# Patient Record
Sex: MALE | Race: WHITE
[De-identification: names, ages, dates, MRNs, and addresses within clinical notes are randomized per-mention and may not be internally consistent; named-entity substitution may affect disease eponyms.]

---

## 2022-10-17 ENCOUNTER — APPOINTMENT (OUTPATIENT)
Dept: ORTHOPEDIC SURGERY | Facility: CLINIC | Age: 67
End: 2022-10-17

## 2022-10-17 VITALS — BODY MASS INDEX: 28.72 KG/M2 | HEIGHT: 67 IN | WEIGHT: 183 LBS

## 2022-10-17 PROCEDURE — 73030 X-RAY EXAM OF SHOULDER: CPT | Mod: RT

## 2022-10-17 PROCEDURE — 99204 OFFICE O/P NEW MOD 45 MIN: CPT

## 2022-10-17 PROCEDURE — 73010 X-RAY EXAM OF SHOULDER BLADE: CPT | Mod: RT

## 2022-10-17 RX ORDER — METHYLPREDNISOLONE 4 MG/1
4 TABLET ORAL
Qty: 1 | Refills: 0 | Status: ACTIVE | COMMUNITY
Start: 2022-10-17 | End: 1900-01-01

## 2022-10-17 NOTE — ASSESSMENT
[FreeTextEntry1] : We reviewed the findings and his options.\par His questions were answered.\par An MRI of the right shoulder is indicated. \par MDP is prescribed.\par Aleve use discussed. \par PT is prescribed. \par If needed, he will call for Tylenol with codeine. \par He will follow up after the MRI, an injection may be considered. \par \par Patient seen by Mazin Gomez M.D.\par Entered by Radha Alcala acting as scribe.

## 2022-10-17 NOTE — REASON FOR VISIT
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 No prior history/treatment. Reaching is painful. Night symptoms can occur. There is no n/t. NSAID use as needed with temporary relief. He has a known history of left shoulder calcific tendonitis which was treated with surgery in ~2014.

## 2022-10-17 NOTE — PHYSICAL EXAM
[Right] : right shoulder [Left] : left shoulder [Moderate] : moderate [Mild] : mild [5 ___] : forward flexion 5[unfilled]/5 [5___] : external rotation 5[unfilled]/5 [] : no ecchymosis [Sitting] : sitting [FreeTextEntry8] : There is minimal GT tenderness. [de-identified] : [place]'s is equivocal.  [FreeTextEntry9] : IR to T12. [TWNoteComboBox6] : internal rotation L4 [TWNoteComboBox4] : passive forward flexion 160 degrees [de-identified] : external rotation 50 degrees

## 2022-10-17 NOTE — IMAGING
[Right] : right shoulder [FreeTextEntry1] : The glenoid has a small spur. The GH joint is ok. There is a decent sized posterior calcium. The AC has cysts and spurs. [FreeTextEntry5] : There is a type II acromion.

## 2022-10-17 NOTE — HISTORY OF PRESENT ILLNESS
[6] : 6 [4] : 4 [Stabbing] : stabbing [Constant] : constant [Meds] : meds [] : no [FreeTextEntry1] : right shoulder [FreeTextEntry5] : pt has been having right shoulder pain for years, recently the pain has gotten worse. [FreeTextEntry7] : down to his fingers  [FreeTextEntry9] : aleve and tylenol  [de-identified] : movement

## 2022-10-17 NOTE — CONSULT LETTER
[Dear  ___] : Dear  [unfilled], [Consult Letter:] : I had the pleasure of evaluating your patient, [unfilled]. [Please see my note below.] : Please see my note below. [Consult Closing:] : Thank you very much for allowing me to participate in the care of this patient.  If you have any questions, please do not hesitate to contact me. [Sincerely,] : Sincerely, [FreeTextEntry3] : Mazin Motley M.D.\par Shoulder Surgery

## 2022-10-18 ENCOUNTER — FORM ENCOUNTER (OUTPATIENT)
Age: 67
End: 2022-10-18

## 2022-10-19 ENCOUNTER — APPOINTMENT (OUTPATIENT)
Dept: ORTHOPEDIC SURGERY | Facility: CLINIC | Age: 67
End: 2022-10-19

## 2022-10-19 ENCOUNTER — APPOINTMENT (OUTPATIENT)
Dept: MRI IMAGING | Facility: CLINIC | Age: 67
End: 2022-10-19

## 2022-10-19 PROCEDURE — 73221 MRI JOINT UPR EXTREM W/O DYE: CPT | Mod: RT,MH

## 2022-10-26 ENCOUNTER — APPOINTMENT (OUTPATIENT)
Dept: ORTHOPEDIC SURGERY | Facility: CLINIC | Age: 67
End: 2022-10-26

## 2022-10-26 VITALS — BODY MASS INDEX: 28.72 KG/M2 | WEIGHT: 183 LBS | HEIGHT: 67 IN

## 2022-10-26 PROCEDURE — 20611 DRAIN/INJ JOINT/BURSA W/US: CPT | Mod: RT

## 2022-10-26 PROCEDURE — 99214 OFFICE O/P EST MOD 30 MIN: CPT | Mod: 25

## 2022-10-26 NOTE — DATA REVIEWED
[FreeTextEntry1] : RIGHT SHOULDER MRI 10/19/22:  There is some AC fluid.  There is slight medial subluxation of the biceps with partial subscapularis tearing.  The calcium is noted with partial tearing.  There is a large cyst in the spinoglenoid notch.

## 2022-10-26 NOTE — HISTORY OF PRESENT ILLNESS
[7] : 7 [0] : 0 [Stabbing] : stabbing [Intermittent] : intermittent [Leisure] : leisure [Sleep] : sleep [Meds] : meds [Full time] : Work status: full time [de-identified] : Patient is here for MRI results. [] : no [FreeTextEntry1] : right shoulder [FreeTextEntry9] : Aleve [de-identified] : Stretching

## 2022-10-26 NOTE — ASSESSMENT
[FreeTextEntry1] : We reviewed the MRI results. \par An EMG is indicated. \par PT is prescribed. \par An injection indicated today. \par Questions answered. \par \par Procedure Name: Large Joint Injection / Aspiration: Depomedrol, Lidocaine and Guidance Ultrasound\par \par Large Joint Injection was performed because of pain and inflammation.\par Depomedrol: An injection of Depomedrol 40 mg , 2 cc.\par Lidocaine: An injection of Lidocaine 1 mg , 13 cc.\par \par Medication was injected in the right subacromial space/glenohumeral joint. Patient has tried OTC's including aspirin, Ibuprofen, Aleve etc or prescription NSAIDS, and/or exercises at home and/ or physical therapy without satisfactory response. After verbal consent using sterile preparation and technique. The risks, benefits, and alternatives to cortisone injection were explained in full to the patient. Risks outlined include but are not limited to infection, sepsis, bleeding, scarring, skin discoloration, temporary increase in pain, syncopal episode, failure to resolve symptoms, allergic reaction, symptom recurrence, and elevation of blood sugar in diabetics. Patient understood the risks. All questions were answered. After discussion of options, patient requested an injection. Oral informed consent was obtained and sterile prep was done of the injection site. Sterile technique was utilized for the procedure including the preparation of the solutions used for the injection. Patient tolerated the procedure well. Advised to ice the injection site this evening. Prep with betadine locally to site. Sterile technique used. Patient tolerated procedure well. Post Procedure Instructions: Patient was advised to call if redness, pain, or fever occur and apply ice for 15 min. out of every hour for the next 12-24 hours as tolerated. Patient was advised to rest the joint(s) for 3 days. Ultrasound Guidance was used for the following reasons: for precise injection in area of tear. Visualization of the needle and placement of injection was performed without complication.

## 2022-10-26 NOTE — PHYSICAL EXAM
[Right] : right shoulder [Moderate] : moderate [Mild] : mild [5 ___] : forward flexion 5[unfilled]/5 [Left] : left shoulder [Sitting] : sitting [4___] : external rotation 4[unfilled]/5 [] : no ecchymosis [de-identified] : [place]'s is equivocal.  [FreeTextEntry8] : There is minimal GT tenderness. [FreeTextEntry9] : IR to T12. [TWNoteComboBox6] : internal rotation L4 [TWNoteComboBox4] : passive forward flexion 160 degrees [de-identified] : external rotation 50 degrees

## 2022-10-26 NOTE — REASON FOR VISIT
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 No prior history/treatment. Reaching is painful. Night symptoms can occur. There is no n/t. NSAID use as needed with temporary relief. He has a known history of left shoulder calcific tendonitis which was treated with surgery in ~2014.  The MRI was completed.  Overall, he is doing OK.

## 2022-10-31 ENCOUNTER — APPOINTMENT (OUTPATIENT)
Dept: NEUROLOGY | Facility: CLINIC | Age: 67
End: 2022-10-31

## 2022-10-31 ENCOUNTER — TRANSCRIPTION ENCOUNTER (OUTPATIENT)
Age: 67
End: 2022-10-31

## 2022-10-31 DIAGNOSIS — G56.01 CARPAL TUNNEL SYNDROME, RIGHT UPPER LIMB: ICD-10-CM

## 2022-10-31 DIAGNOSIS — M79.621 PAIN IN RIGHT UPPER ARM: ICD-10-CM

## 2022-10-31 PROCEDURE — 95886 MUSC TEST DONE W/N TEST COMP: CPT

## 2022-10-31 PROCEDURE — 95910 NRV CNDJ TEST 7-8 STUDIES: CPT

## 2022-11-16 ENCOUNTER — APPOINTMENT (OUTPATIENT)
Dept: ORTHOPEDIC SURGERY | Facility: CLINIC | Age: 67
End: 2022-11-16

## 2022-11-23 ENCOUNTER — APPOINTMENT (OUTPATIENT)
Dept: ORTHOPEDIC SURGERY | Facility: CLINIC | Age: 67
End: 2022-11-23

## 2022-11-23 PROCEDURE — 99214 OFFICE O/P EST MOD 30 MIN: CPT

## 2022-11-23 NOTE — HISTORY OF PRESENT ILLNESS
[7] : 7 [0] : 0 [Stabbing] : stabbing [Intermittent] : intermittent [Leisure] : leisure [Sleep] : sleep [Meds] : meds [Full time] : Work status: full time [de-identified] : Patient is here for MRI results. [] : no [FreeTextEntry1] : right shoulder [FreeTextEntry9] : Aleve [de-identified] : Stretching

## 2022-11-23 NOTE — PHYSICAL EXAM
[Right] : right shoulder [Moderate] : moderate [Mild] : mild [5 ___] : forward flexion 5[unfilled]/5 [Left] : left shoulder [Sitting] : sitting [5___] : external rotation 5[unfilled]/5 [] : no ecchymosis [FreeTextEntry8] : There is minimal GT tenderness. [de-identified] : [place]'s is equivocal.  [FreeTextEntry9] : IR to T12. [TWNoteComboBox6] : internal rotation L4 [TWNoteComboBox4] : passive forward flexion 160 degrees [de-identified] : external rotation 50 degrees

## 2022-11-23 NOTE — PHYSICAL EXAM
[Right] : right shoulder [Moderate] : moderate [Mild] : mild [5 ___] : forward flexion 5[unfilled]/5 [Left] : left shoulder [Sitting] : sitting [5___] : external rotation 5[unfilled]/5 [] : no ecchymosis [FreeTextEntry8] : There is minimal GT tenderness. [de-identified] : [place]'s is equivocal.  [FreeTextEntry9] : IR to T12. [TWNoteComboBox6] : internal rotation L4 [TWNoteComboBox4] : passive forward flexion 160 degrees [de-identified] : external rotation 50 degrees

## 2022-11-23 NOTE — ASSESSMENT
[FreeTextEntry1] : Given his relative strength and improvement, we will pursue a nonoperative course. \par He has PT already scheduled.\par The indications for surgery were reviewed. \par He agrees with the plan. \par Questions answered. \par \par Patient seen by Mazin Gomez M.D.\par Entered by Radha Alcala acting as scribe.

## 2022-11-23 NOTE — HISTORY OF PRESENT ILLNESS
[7] : 7 [0] : 0 [Stabbing] : stabbing [Intermittent] : intermittent [Leisure] : leisure [Sleep] : sleep [Meds] : meds [Full time] : Work status: full time [de-identified] : Patient is here for MRI results. [] : no [FreeTextEntry1] : right shoulder [FreeTextEntry9] : Aleve [de-identified] : Stretching

## 2022-11-23 NOTE — DATA REVIEWED
[FreeTextEntry1] : RIGHT SHOULDER MRI 10/19/22:  There is some AC fluid.  There is slight medial subluxation of the biceps with partial subscapularis tearing.  The calcium is noted with partial tearing.  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

## 2022-12-07 ENCOUNTER — APPOINTMENT (OUTPATIENT)
Dept: ORTHOPEDIC SURGERY | Facility: CLINIC | Age: 67
End: 2022-12-07

## 2022-12-14 ENCOUNTER — APPOINTMENT (OUTPATIENT)
Dept: ORTHOPEDIC SURGERY | Facility: CLINIC | Age: 67
End: 2022-12-14

## 2022-12-14 VITALS — BODY MASS INDEX: 29.03 KG/M2 | WEIGHT: 185 LBS | HEIGHT: 67 IN

## 2022-12-14 PROCEDURE — 99214 OFFICE O/P EST MOD 30 MIN: CPT

## 2022-12-14 NOTE — ASSESSMENT
[FreeTextEntry1] : We discussed his course. \par PT is prescribed. \par He will call for follow up. \par Questions answered. \par \par Patient seen by Mazin Gomez M.D. \par Entered by Radha Alcala acting as scribe.

## 2022-12-14 NOTE — HISTORY OF PRESENT ILLNESS
[Full time] : Work status: full time [5] : 5 [3] : 3 [Dull/Aching] : dull/aching [Intermittent] : intermittent [Sleep] : sleep [Rest] : rest [de-identified] : Patient is here for a follow up on his rightt shoulder. Pain has improved.  [] : no [FreeTextEntry1] : right shoulder [FreeTextEntry9] : Advil [de-identified] : overhead activity [de-identified] : Physical therapy 2 x a week, HEP

## 2022-12-14 NOTE — REASON FOR VISIT
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 No prior history/treatment. Reaching is painful. Night symptoms can occur. There is no n/t. NSAID use as needed with temporary relief. He has a known history of left shoulder calcific tendonitis which was treated with surgery in ~2014.  The MRI was completed.  Overall, he is doing better.  The injection with PT have helped. He feels 66% better.

## 2022-12-14 NOTE — PHYSICAL EXAM
[Right] : right shoulder [Moderate] : moderate [Mild] : mild [5 ___] : forward flexion 5[unfilled]/5 [5___] : external rotation 5[unfilled]/5 [Left] : left shoulder [Sitting] : sitting [] : no ecchymosis [FreeTextEntry8] : There is minimal GT tenderness. [de-identified] : There is minimal discomfort.  [de-identified] : [place]'s is equivocal.  [FreeTextEntry9] : IR to T12. [TWNoteComboBox6] : internal rotation L4 [TWNoteComboBox4] : passive forward flexion 160 degrees [de-identified] : external rotation 50 degrees

## 2023-03-05 ENCOUNTER — FORM ENCOUNTER (OUTPATIENT)
Age: 68
End: 2023-03-05

## 2023-03-06 ENCOUNTER — APPOINTMENT (OUTPATIENT)
Dept: MRI IMAGING | Facility: CLINIC | Age: 68
End: 2023-03-06
Payer: MEDICARE

## 2023-03-06 ENCOUNTER — APPOINTMENT (OUTPATIENT)
Dept: ORTHOPEDIC SURGERY | Facility: CLINIC | Age: 68
End: 2023-03-06
Payer: MEDICARE

## 2023-03-06 ENCOUNTER — FORM ENCOUNTER (OUTPATIENT)
Age: 68
End: 2023-03-06

## 2023-03-06 VITALS — WEIGHT: 185 LBS | HEIGHT: 67.5 IN | BODY MASS INDEX: 28.7 KG/M2

## 2023-03-06 PROCEDURE — 99214 OFFICE O/P EST MOD 30 MIN: CPT

## 2023-03-06 PROCEDURE — 73221 MRI JOINT UPR EXTREM W/O DYE: CPT | Mod: RT,MH

## 2023-03-06 RX ORDER — METHYLPREDNISOLONE 4 MG/1
4 TABLET ORAL
Qty: 1 | Refills: 0 | Status: ACTIVE | COMMUNITY
Start: 2023-03-06 | End: 1900-01-01

## 2023-03-06 NOTE — ASSESSMENT
[FreeTextEntry1] : We reviewed his history and his options.\par Will order an MRI of the neck to assess nerve compression. \par Will order an MRI of the right shoulder to asses the status of the ganglion cyst. \par MDP is prescribed. \par His questions were answered.\par \par Patient seen by Dr. Mazin Gomez.\par Progress note completed by Teresa BOTELLO.\par Patient seen by Teresa BOTELLO under the supervision of Dr. Mazin Gomez.\par Entered by Teresa BOTELLO acting as a scribe for Dr. Mazin Gomez.\par Entered by Radha Alcala acting as scribe.

## 2023-03-06 NOTE — PHYSICAL EXAM
[Right] : right shoulder [Moderate] : moderate [Mild] : mild [5 ___] : forward flexion 5[unfilled]/5 [5___] : external rotation 5[unfilled]/5 [Left] : left shoulder [Sitting] : sitting [] : no ecchymosis [FreeTextEntry8] : There is minimal GT tenderness. [de-identified] : There is minimal discomfort.  [de-identified] : [place]'s is equivocal.  [FreeTextEntry9] : IR to T12. [TWNoteComboBox6] : internal rotation L4 [TWNoteComboBox4] : passive forward flexion 160 degrees [de-identified] : external rotation 50 degrees

## 2023-03-06 NOTE — HISTORY OF PRESENT ILLNESS
[3] : 3 [0] : 0 [de-identified] : pt is here today for a follow up for his right shoulder. pt states his pain came back recently, started to have some tingling down his arm as well  [FreeTextEntry1] : right shoulder  [de-identified] : resting

## 2023-03-06 NOTE — REASON FOR VISIT
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 He had been doing well until early February 2023 when he started having tingling down the arm.  He has a known history of left shoulder calcific tendonitis which was treated with surgery in ~2014.  The MRI was completed in October 2022.  The SA/GH injection in October with PT had helped, though his pain has returned recently without new injury.  There is soreness down the arm with some tingling.

## 2023-03-07 ENCOUNTER — APPOINTMENT (OUTPATIENT)
Dept: MRI IMAGING | Facility: CLINIC | Age: 68
End: 2023-03-07
Payer: MEDICARE

## 2023-03-07 PROCEDURE — 72141 MRI NECK SPINE W/O DYE: CPT | Mod: MH

## 2023-03-15 ENCOUNTER — APPOINTMENT (OUTPATIENT)
Dept: ORTHOPEDIC SURGERY | Facility: CLINIC | Age: 68
End: 2023-03-15
Payer: MEDICARE

## 2023-03-15 VITALS — WEIGHT: 185 LBS | HEIGHT: 67.5 IN | BODY MASS INDEX: 28.7 KG/M2

## 2023-03-15 DIAGNOSIS — M75.41 IMPINGEMENT SYNDROME OF RIGHT SHOULDER: ICD-10-CM

## 2023-03-15 DIAGNOSIS — G56.81 OTHER SPECIFIED MONONEUROPATHIES OF RIGHT UPPER LIMB: ICD-10-CM

## 2023-03-15 DIAGNOSIS — M75.31 CALCIFIC TENDINITIS OF RIGHT SHOULDER: ICD-10-CM

## 2023-03-15 DIAGNOSIS — M25.511 PAIN IN RIGHT SHOULDER: ICD-10-CM

## 2023-03-15 DIAGNOSIS — M67.921 UNSPECIFIED DISORDER OF SYNOVIUM AND TENDON, RIGHT UPPER ARM: ICD-10-CM

## 2023-03-15 PROCEDURE — 99214 OFFICE O/P EST MOD 30 MIN: CPT

## 2023-03-15 NOTE — REASON FOR VISIT
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 He had been doing well until early February 2023 when he started having tingling down the arm.  He has a known history of left shoulder calcific tendonitis which was treated with surgery in ~2014.  The MRI was completed in October 2022.  The SA/GH injection in October with PT had helped, though his pain has returned recently without new injury.  There is soreness down the arm with some tingling.  Overall, he is feeling better since the MDP.  The MRIs of the shoulder and neck were completed.

## 2023-03-15 NOTE — HISTORY OF PRESENT ILLNESS
[1] : 2 [Dull/Aching] : dull/aching [Tingling] : tingling [] : yes [Intermittent] : intermittent [Nothing helps with pain getting better] : Nothing helps with pain getting better [Full time] : Work status: full time [de-identified] : Patient is here for MRI results. Pain has improved.  [FreeTextEntry1] : Right shoulder and neck [FreeTextEntry7] : a little below the elbow [de-identified] : none

## 2023-03-15 NOTE — PHYSICAL EXAM
[Right] : right shoulder [Moderate] : moderate [Mild] : mild [5 ___] : forward flexion 5[unfilled]/5 [5___] : external rotation 5[unfilled]/5 [Left] : left shoulder [Sitting] : sitting [] : no ecchymosis [FreeTextEntry8] : There is minimal GT tenderness. [de-identified] : There is minimal discomfort.  [FreeTextEntry9] : IR to T12. [de-identified] : [place]'s is equivocal.  [TWNoteComboBox6] : internal rotation L4 [TWNoteComboBox4] : passive forward flexion 160 degrees [de-identified] : external rotation 50 degrees

## 2023-03-15 NOTE — ASSESSMENT
[FreeTextEntry1] : We discussed the MRI findings. \par He will follow up with his PCP regarding thyroid nodule. \par Advised to follow up with Neuro. \par He will follow up with Dr. Rogers for cervical issues. \par Questions answered. \par \par Patient seen by Mazin Gomez M.D.\par Entered by Radha Alcala acting as scribe.

## 2023-03-15 NOTE — DATA REVIEWED
[FreeTextEntry1] : RIGHT SHOULDER MRI 10/19/22:  There is some AC fluid.  There is slight medial subluxation of the biceps with partial subscapularis tearing.  The calcium is noted with partial tearing.  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 The cyst remains in position. The report suggests improved calcium, no change in cyst. The biceps is not involved. The muscle is good. \par \par MRI CERVICAL SPINE 3/7/23: There are multilevel degenerative changes. Stenosis and root impoingement noted.

## 2023-03-22 ENCOUNTER — APPOINTMENT (OUTPATIENT)
Dept: ORTHOPEDIC SURGERY | Facility: CLINIC | Age: 68
End: 2023-03-22
Payer: MEDICARE

## 2023-03-22 DIAGNOSIS — M47.812 SPONDYLOSIS W/OUT MYELOPATHY OR RADICULOPATHY, CERVICAL REGION: ICD-10-CM

## 2023-03-22 PROCEDURE — 99214 OFFICE O/P EST MOD 30 MIN: CPT

## 2023-03-22 RX ORDER — GABAPENTIN 300 MG/1
300 CAPSULE ORAL
Qty: 60 | Refills: 1 | Status: ACTIVE | COMMUNITY
Start: 2023-03-22 | End: 1900-01-01

## 2023-03-22 NOTE — HISTORY OF PRESENT ILLNESS
[Neck] : neck [Gradual] : gradual [Sudden] : sudden [6] : 6 [5] : 5 [Burning] : burning [Shooting] : shooting [Stabbing] : stabbing [Intermittent] : intermittent [Rest] : rest [Exercising] : exercising [Full time] : Work status: full time [de-identified] : 03/22/2023: 67 year old RHD male [facility] Pain for the last 3 months. No injury. c/o numbness/ tingling pain down his RUE. No weakness. Denies neck pain/ stiffness. No nighttime sx. Denies radicular sx/ N/ T/ weakness on LEft side. Left arm is okay.  \par \par No h/o Spine Surgery\par \par Has tried MDP with no improvement\par No prior PT/ injections/ acupuncture/ chiropractor\par Had an injection in the shoulder \par \par PMHx: No DM/ no blood thinners\par \par Occupation: business owner - desk work\par \par Xrays:\par C spine - not done \par \par MRI Cspine:\par 1. Straightening of the cervical lordosis, slight retrolisthesis at C4-C5 and C5-C6, and slight anterolisthesis at \par C6-C7 and C7-T1.\par 2. Multilevel degenerative disc disease and multiple disc herniations with cord impingement and right greater \par than left exiting nerve root impingement at C4-C5 and C5-C6, right exiting nerve root impingement at C6-C7, \par left exiting nerve root impingement at C3-C4, and multilevel foraminal narrowing.\par 3. No acute fracture or cord compression.\par 4. Findings suggesting a cystic nodule in the left thyroid gland measuring approximately 1.4 cm incompletely \par evaluated on the current exam. Recommend thyroid ultrasound correlation to further evaluate. 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

## 2023-03-22 NOTE — DISCUSSION/SUMMARY
[de-identified] : reviewed the case and the imaging\par discussoin of tx optoins \par Has NF narrowing in the C spine with radiculopathy \par referral to pain management \par gabapentin discussed \par would be a hesitent to operate on the neck as he doesn’t have neck pain\par \par hes had the thryoid nodule checked on

## 2023-03-30 ENCOUNTER — APPOINTMENT (OUTPATIENT)
Dept: PAIN MANAGEMENT | Facility: CLINIC | Age: 68
End: 2023-03-30
Payer: MEDICARE

## 2023-03-30 VITALS — HEIGHT: 67 IN | WEIGHT: 185 LBS | BODY MASS INDEX: 29.03 KG/M2

## 2023-03-30 PROCEDURE — 99204 OFFICE O/P NEW MOD 45 MIN: CPT

## 2023-03-30 NOTE — DISCUSSION/SUMMARY
[de-identified] : After discussing various treatment options with the patient including but not limited to oral medications, physical therapy, exercise modalities as well as interventional spinal injections, we have decided with the following plan:\par \par - Continue Home exercises, stretching, activity modification, physical therapy, and conservative care.\par - MRI report and/or images was reviewed and discussed with the patient.\par - Recommend C7-T1 Cervical Epidural Steroid Injection under fluoroscopic guidance with image. (right) \par - The risks, benefits and alternatives of the proposed procedure were explained in detail with the patient. The risks outlined include but are not limited to infection, bleeding, post-dural puncture headache, nerve injury, a temporary increase in pain, failure to resolve symptoms, allergic reaction, symptom recurrence, and possible elevation of blood sugar in diabetics. All questions were answered to patient's apparent satisfaction and he/she verbalized an understanding.\par - Patient is presenting with acute/sub-acute radicular pain with impairment in ADLs and functionality.  The pain has not responded to conservative care including NSAID therapy and/or physical therapy.  There is no bleeding tendency, unstable medical condition, or systemic infection.\par - Follow up in 1-2 weeks post injection for re-evaluation.\par *pt takes 81mg Aspirin\par

## 2023-03-30 NOTE — PHYSICAL EXAM
[de-identified] : Constitutional; Appears well, no apparent distress\par Ability to communicate: Normal \par Respiratory: non-labored breathing\par Skin: No rash noted\par Head: Normocephalic, atraumatic\par Neck: no visible thyroid enlargement\par Eyes: Extraocular movements intact\par Neurologic: Alert and oriented x3\par Psychiatric: normal mood, affect and behavior  [] : positive [place]

## 2023-05-04 ENCOUNTER — APPOINTMENT (OUTPATIENT)
Dept: PAIN MANAGEMENT | Facility: CLINIC | Age: 68
End: 2023-05-04
Payer: MEDICARE

## 2023-05-04 DIAGNOSIS — M54.12 RADICULOPATHY, CERVICAL REGION: ICD-10-CM

## 2023-05-04 PROCEDURE — 62321 NJX INTERLAMINAR CRV/THRC: CPT

## 2023-05-04 NOTE — PROCEDURE
[FreeTextEntry3] : Date of Service: 05/04/2023 \par \par Account: [id number]\par \par Patient: OWEN PALOMARES \par \par YOB: 1955\par \par Age: 67 year\par \par \par Surgeon:                                                         Telly Bell D.O.\par \par Pre-Operative Diagnosis:                            Cervical Radiculopathy (M54.12)\par \par Post-Operative Diagnosis:                          Cervical Radiculopathy (M54.12)\par \par Procedure:                                                     Interlaminar cervical epidural steroid injection (C7-T1) under fluoroscopic guidance\par \par Anesthesia:                                                    Local with MAC\par \par \par This procedure was carried out using fluoroscopic guidance.  The risks and benefits of the procedure were discussed extensively with the patient.  The consent of the patient was obtained and the following procedure was performed.\par \par The patient was placed in the prone position using thoracic and chin supports.  The cervical area was prepped and draped in a sterile fashion. A timeout was performed with all essential staff present and the site and side were verified. The fluoroscope visualized the C7-T1 interspace using slight cephalad-caudad angulation and this area was marked.  Using sterile technique, the superficial skin was anesthetized with 1% Lidocaine without epinephrine.  An 18-gauge Tuohy needle was advanced under fluoroscopy using [id number] technique until ligament was engaged.  The stylette was then removed and a column of preservative free normal saline flushed through the Tuohy needle and left with a concave fluid level above the butterfly portion of the Tuohy needle.  The needle was then advanced under fluoroscopic guidance until the column of saline disappeared.  Lateral view confirmed final needle tip placement in the epidural space.  After negative aspiration for heme and CSF, 1 cc of Omnipaque contrast injected under live fluoroscopy, confirmed good cervical epidurogram.  \par \par Cervical epidurogram showed no evidence of intrathecal or intravascular flow, and good bilateral epidural flow from C3 to T2 levels.  An injectate of 3 cc of preservative free normal saline plus 12 mg of betamethasone was then injected into the epidural space. The needle was subsequently removed and pressure was applied.\par \par Anesthesia personnel were present throughout the procedure.  The patient tolerated the procedure well and was instructed to contact me immediately if there were any problems.\par \par Telly Bell D.O.\par

## 2023-05-18 ENCOUNTER — APPOINTMENT (OUTPATIENT)
Dept: PAIN MANAGEMENT | Facility: CLINIC | Age: 68
End: 2023-05-18

## 2024-09-06 ENCOUNTER — APPOINTMENT (OUTPATIENT)
Dept: ORTHOPEDIC SURGERY | Facility: CLINIC | Age: 69
End: 2024-09-06
Payer: MEDICARE

## 2024-09-06 ENCOUNTER — APPOINTMENT (OUTPATIENT)
Dept: MRI IMAGING | Facility: CLINIC | Age: 69
End: 2024-09-06

## 2024-09-06 VITALS — BODY MASS INDEX: 28.56 KG/M2 | HEIGHT: 67 IN | WEIGHT: 182 LBS

## 2024-09-06 DIAGNOSIS — Z86.79 PERSONAL HISTORY OF OTHER DISEASES OF THE CIRCULATORY SYSTEM: ICD-10-CM

## 2024-09-06 DIAGNOSIS — M54.16 RADICULOPATHY, LUMBAR REGION: ICD-10-CM

## 2024-09-06 PROCEDURE — G2211 COMPLEX E/M VISIT ADD ON: CPT

## 2024-09-06 PROCEDURE — 73503 X-RAY EXAM HIP UNI 4/> VIEWS: CPT | Mod: RT

## 2024-09-06 PROCEDURE — 99214 OFFICE O/P EST MOD 30 MIN: CPT

## 2024-09-06 PROCEDURE — 72148 MRI LUMBAR SPINE W/O DYE: CPT | Mod: MH

## 2024-09-06 RX ORDER — METHYLPREDNISOLONE 4 MG/1
4 TABLET ORAL
Qty: 1 | Refills: 0 | Status: ACTIVE | COMMUNITY
Start: 2024-09-06 | End: 1900-01-01

## 2024-09-06 RX ORDER — MELOXICAM 15 MG/1
15 TABLET ORAL
Qty: 30 | Refills: 1 | Status: ACTIVE | COMMUNITY
Start: 2024-09-06 | End: 1900-01-01

## 2024-09-06 RX ORDER — AMLODIPINE BESYLATE 5 MG/1
TABLET ORAL
Refills: 0 | Status: ACTIVE | COMMUNITY

## 2024-09-06 RX ORDER — EZETIMIBE 10 MG/1
TABLET ORAL
Refills: 0 | Status: ACTIVE | COMMUNITY

## 2024-09-06 RX ORDER — RAMIPRIL 10 MG
TABLET ORAL
Refills: 0 | Status: ACTIVE | COMMUNITY

## 2024-09-06 NOTE — ASSESSMENT
[FreeTextEntry1] : WE DISCUSSED FINDINGS. WILL START PT LUMBAR SPINE AND GIVE MDP WE DISCUSSED NSAIDS AND USE. WILL TAKE OTC MEDS AFTER MDP PRN MOBIC 15mg SENT FOR AFTER  WILL GET MRI AND FU WITH PAIN MANAGEMENT

## 2024-09-06 NOTE — IMAGING
[Right] : right hip with pelvis [There are no fractures, subluxations or dislocations. No significant abnormalities are seen] : There are no fractures, subluxations or dislocations. No significant abnormalities are seen [FreeTextEntry9] : LUMBAR DDD NOTED

## 2024-09-06 NOTE — HISTORY OF PRESENT ILLNESS
[Sharp] : sharp [Shooting] : shooting [Stabbing] : stabbing [Intermittent] : intermittent [Ice] : ice [Walking] : walking [Exercising] : exercising [de-identified] : 9.6.24 PATIENT HERE FOR RIGHT HIP PAIN. PAIN STARTED TWO AND A HALF WEEKS AGO, NO INJURY. STATES PAIN STARTED WHEN PLAYING GOLF, SEEN BY CHIRO WITH NO IMPROVMENT. PAIN TO LATERAL HIP WITH RADIATION TO RIGHT LEG. NO PARETHESIAS, NO BB DYSFUCNTION

## 2024-09-06 NOTE — PHYSICAL EXAM
[Extension] : extension [Right] : right hip [] : no numbness/tingling of foot [FreeTextEntry8] : GOOD ROM WITHOUT PAIN

## 2024-09-19 ENCOUNTER — APPOINTMENT (OUTPATIENT)
Dept: PAIN MANAGEMENT | Facility: CLINIC | Age: 69
End: 2024-09-19
Payer: MEDICARE

## 2024-09-19 VITALS — BODY MASS INDEX: 28.56 KG/M2 | WEIGHT: 182 LBS | HEIGHT: 67 IN

## 2024-09-19 DIAGNOSIS — M54.50 LOW BACK PAIN, UNSPECIFIED: ICD-10-CM

## 2024-09-19 DIAGNOSIS — M79.10 MYALGIA, UNSPECIFIED SITE: ICD-10-CM

## 2024-09-19 DIAGNOSIS — M54.16 RADICULOPATHY, LUMBAR REGION: ICD-10-CM

## 2024-09-19 PROCEDURE — 99214 OFFICE O/P EST MOD 30 MIN: CPT

## 2024-09-19 RX ORDER — OXYCODONE AND ACETAMINOPHEN 5; 325 MG/1; MG/1
5-325 TABLET ORAL
Qty: 28 | Refills: 0 | Status: ACTIVE | COMMUNITY
Start: 2024-09-19 | End: 1900-01-01

## 2024-09-19 NOTE — DISCUSSION/SUMMARY
[de-identified] : After discussing various treatment options with the patient including but not limited to oral medications, physical therapy, exercise modalities as well as interventional spinal injections, we have decided with the following plan:  - Continue Home exercises, stretching, activity modification, physical therapy, and conservative care. - MRI report and/or images was reviewed and discussed with the patient. - Recommend Left L4-5, L5-S1 Transforaminal Epidural Steroid Injection under fluoroscopic guidance with image. - The risks, benefits and alternatives of the proposed procedure were explained in detail with the patient. The risks outlined include but are not limited to infection, bleeding, post-dural puncture headache, nerve injury, a temporary increase in pain, failure to resolve symptoms, allergic reaction, symptom recurrence, and possible elevation of blood sugar in diabetics. All questions were answered to patient's apparent satisfaction and he/she verbalized an understanding. - Patient is presenting with acute/sub-acute radicular pain with impairment in ADLs and functionality.  The pain has not responded to conservative care including NSAID therapy and/or physical therapy.  There is no bleeding tendency, unstable medical condition, or systemic infection. - Follow up in 1-2 weeks post injection for re-evaluation. *pt takes 81mg Aspirin  - Will prescribe Oxycodone/Acetaminophen 5/325 Q6hrs PRN #28 for pain control. I have consulted the  Registry for the purpose of reviewing the patient's controlled substances. Risks of opioid use for chronic non-cancer pain discussed at length, including development of tolerance, addiction, opioid-induced hyperalgesia, hypogonadism, disturbance of sleep, etc. Explained that current medical evidence does not support the long-term use of opioids for this patient's condition, and that our plan is to eventually discontinue opioids altogether

## 2024-09-19 NOTE — HISTORY OF PRESENT ILLNESS
[Neck] : neck [1] : 2 [Tingling] : tingling [Intermittent] : intermittent [Household chores] : household chores [Meds] : meds [Nothing helps with pain getting better] : Nothing helps with pain getting better [Standing] : standing [Walking] : walking [Lower back] : lower back [7] : 7 [8] : 8 [Radiating] : radiating [Shooting] : shooting [Constant] : constant [Leisure] : leisure [Sleep] : sleep [FreeTextEntry1] : 09/19/2024: Pt here to go over L-spine MRI Pain started a month ago and is on the LEFT buttock and down the left lateral thigh and lower leg to the calf described as a sharp shooting pain with associated numbness. Pain is worse at night. Takes Aleve PRN. Has tried gabapentin but didn't like it. Completed MDP with no relief and now taking Mobic. Saw Dr. Schofield who recommended pain management.   MRI L-spine 9/6/24 independently reviewed: L3-4 HNP with R>L NF stenosis and L3 impingement; L4-5 HNP with moderate central stenosis and b/l L4,L5 impingement with L>R NF stenosis; L5-S1 HNP with right NF stenosis and L5 impingement.   Initial HPI 03/30/23: Pain started Jan 2023 and is radiating down from the RIGHT shoulder to the elbow described as a tingling. Seeing Dr. Gomez for the right shoulder pain. Saw Dr. Rogers who recommended MARQUITA.   MRI Cervical Spine 3/7/23 independently reviewed: multilevel DDD and multiple HNPs  EMG showing right suprascapular neuropathy; no evidence of right radiculopathy.  Conservative Care: Has done PT for the shoulder  Pain Medications: completed MDPx2 which helped for he shoulder; tylenol PRN; gabapentin 600mg QHS  Past Injections: shoulder and elbow injections  Spine surgery: none  Blood thinners: *pt takes 81mg Aspirin   [] : This patient has had an injection before: no [FreeTextEntry6] : spasm [FreeTextEntry7] : left side, knee [de-identified] : l mri

## 2024-09-19 NOTE — PHYSICAL EXAM
[de-identified] : Constitutional; Appears well, no apparent distress\par  Ability to communicate: Normal \par  Respiratory: non-labored breathing\par  Skin: No rash noted\par  Head: Normocephalic, atraumatic\par  Neck: no visible thyroid enlargement\par  Eyes: Extraocular movements intact\par  Neurologic: Alert and oriented x3\par  Psychiatric: normal mood, affect and behavior  [] : non-antalgic

## 2024-09-19 NOTE — HISTORY OF PRESENT ILLNESS
[Neck] : neck [1] : 2 [Tingling] : tingling [Intermittent] : intermittent [Household chores] : household chores [Meds] : meds [Nothing helps with pain getting better] : Nothing helps with pain getting better [Standing] : standing [Walking] : walking [Lower back] : lower back [7] : 7 [8] : 8 [Radiating] : radiating [Shooting] : shooting [Constant] : constant [Leisure] : leisure [Sleep] : sleep [FreeTextEntry1] : 09/19/2024: Pt here to go over L-spine MRI Pain started a month ago and is on the LEFT buttock and down the left lateral thigh and lower leg to the calf described as a sharp shooting pain with associated numbness. Pain is worse at night. Takes Aleve PRN. Has tried gabapentin but didn't like it. Completed MDP with no relief and now taking Mobic. Saw Dr. Schofield who recommended pain management.   MRI L-spine 9/6/24 independently reviewed: L3-4 HNP with R>L NF stenosis and L3 impingement; L4-5 HNP with moderate central stenosis and b/l L4,L5 impingement with L>R NF stenosis; L5-S1 HNP with right NF stenosis and L5 impingement.   Initial HPI 03/30/23: Pain started Jan 2023 and is radiating down from the RIGHT shoulder to the elbow described as a tingling. Seeing Dr. Gomez for the right shoulder pain. Saw Dr. Rogers who recommended MARQUITA.   MRI Cervical Spine 3/7/23 independently reviewed: multilevel DDD and multiple HNPs  EMG showing right suprascapular neuropathy; no evidence of right radiculopathy.  Conservative Care: Has done PT for the shoulder  Pain Medications: completed MDPx2 which helped for he shoulder; tylenol PRN; gabapentin 600mg QHS  Past Injections: shoulder and elbow injections  Spine surgery: none  Blood thinners: *pt takes 81mg Aspirin   [] : This patient has had an injection before: no [FreeTextEntry6] : spasm [FreeTextEntry7] : left side, knee [de-identified] : l mri

## 2024-09-19 NOTE — PHYSICAL EXAM
[de-identified] : Constitutional; Appears well, no apparent distress\par  Ability to communicate: Normal \par  Respiratory: non-labored breathing\par  Skin: No rash noted\par  Head: Normocephalic, atraumatic\par  Neck: no visible thyroid enlargement\par  Eyes: Extraocular movements intact\par  Neurologic: Alert and oriented x3\par  Psychiatric: normal mood, affect and behavior  [] : non-antalgic

## 2024-09-19 NOTE — DISCUSSION/SUMMARY
[de-identified] : After discussing various treatment options with the patient including but not limited to oral medications, physical therapy, exercise modalities as well as interventional spinal injections, we have decided with the following plan:  - Continue Home exercises, stretching, activity modification, physical therapy, and conservative care. - MRI report and/or images was reviewed and discussed with the patient. - Recommend Left L4-5, L5-S1 Transforaminal Epidural Steroid Injection under fluoroscopic guidance with image. - The risks, benefits and alternatives of the proposed procedure were explained in detail with the patient. The risks outlined include but are not limited to infection, bleeding, post-dural puncture headache, nerve injury, a temporary increase in pain, failure to resolve symptoms, allergic reaction, symptom recurrence, and possible elevation of blood sugar in diabetics. All questions were answered to patient's apparent satisfaction and he/she verbalized an understanding. - Patient is presenting with acute/sub-acute radicular pain with impairment in ADLs and functionality.  The pain has not responded to conservative care including NSAID therapy and/or physical therapy.  There is no bleeding tendency, unstable medical condition, or systemic infection. - Follow up in 1-2 weeks post injection for re-evaluation. *pt takes 81mg Aspirin  - Will prescribe Oxycodone/Acetaminophen 5/325 Q6hrs PRN #28 for pain control. I have consulted the  Registry for the purpose of reviewing the patient's controlled substances. Risks of opioid use for chronic non-cancer pain discussed at length, including development of tolerance, addiction, opioid-induced hyperalgesia, hypogonadism, disturbance of sleep, etc. Explained that current medical evidence does not support the long-term use of opioids for this patient's condition, and that our plan is to eventually discontinue opioids altogether

## 2024-09-19 NOTE — REASON FOR VISIT
[Follow-Up Visit] : a follow-up pain management visit [Initial Consultation] : an initial pain management consultation [FreeTextEntry2] :  referred pt for back pain

## 2024-09-20 ENCOUNTER — APPOINTMENT (OUTPATIENT)
Dept: ORTHOPEDIC SURGERY | Facility: CLINIC | Age: 69
End: 2024-09-20

## 2024-10-01 ENCOUNTER — APPOINTMENT (OUTPATIENT)
Dept: PAIN MANAGEMENT | Facility: CLINIC | Age: 69
End: 2024-10-01
Payer: MEDICARE

## 2024-10-01 DIAGNOSIS — M54.16 RADICULOPATHY, LUMBAR REGION: ICD-10-CM

## 2024-10-01 PROCEDURE — 64484 NJX AA&/STRD TFRM EPI L/S EA: CPT | Mod: LT,59

## 2024-10-01 PROCEDURE — J3490M: CUSTOM | Mod: NC

## 2024-10-01 PROCEDURE — 64483 NJX AA&/STRD TFRM EPI L/S 1: CPT | Mod: LT

## 2024-10-01 NOTE — PROCEDURE
[FreeTextEntry3] : Date of Service: 10/01/2024   Account: [id number]  Patient: OWEN PALOMARES   YOB: 1955  Age: 69 year   Surgeon:                                                        Telly Bell D.O.  Assistant:                                                        None  Pre-Operative Diagnosis:                            Lumbosacral radiculitis (M54.17)  Post-Operative Diagnosis:                          Same  Procedure:                                                     Left L4-5, L5-S1, transforaminal epidural steroid injection under fluoroscopic guidance.  Anesthesia:                                                    Local with MAC  This procedure was carried out using fluoroscopic guidance.  The risks and benefits of the procedure were discussed extensively with the patient.  The consent of the patient was obtained and the following procedure was performed. The patient was placed in the prone position on the fluoroscopic table and the lumbar area was prepped and draped in a sterile fashion. A timeout was performed with all essential staff present and the site and side were verified.  The Left L4-5, L5-S1 neural foramen were identified on left oblique "misael dog" anatomical view at the 6 o'clock position using fluoroscopic guidance, and the area was marked. The overlying skin and subcutaneous structures were anesthetized using sterile technique with 1% Lidocaine.  A 22-gauge spinal needle was directed toward the inferior (6 o'clock) position of the pedicle, which formed the roof of the identified foramen.  Once in the epidural space, after negative aspiration for heme and CSF, 1cc of Omnipaque contrast was injected under live fluoroscopy to confirm epidural location and assess filling defects and rule out intravascular needle placement.   The following contrast flow and epidurogram was observed: no intravascular or intrathecal flow pattern was noted.  No blood or CSF was aspirated. Contrast spread appeared to outline the left L4 and L5 nerve roots and spread medially into the epidural space.    After this, 3 ml of a total mixture of 12 mg betamethasone, 2 ml of preservative free normal saline, and 2 ml of 0.25% bupivacaine was administered without any resistance in the epidural space at each of the two levels.   The needle was subsequently removed.  Vital signs remained normal.  Pulse oximeter was used throughout the procedure and the patient's pulse and oxygen saturation remained within normal limits.  The patient tolerated the procedure well.  There were no complications.  The patient was instructed to apply ice over the injection sites for twenty minutes every two hours for the next 24 to 48 hours.  The patient was also instructed to contact me immediately if there were any problems.  Telly Bell D.O.

## 2024-10-09 ENCOUNTER — APPOINTMENT (OUTPATIENT)
Age: 69
End: 2024-10-09

## 2024-10-31 ENCOUNTER — APPOINTMENT (OUTPATIENT)
Dept: PAIN MANAGEMENT | Facility: CLINIC | Age: 69
End: 2024-10-31
Payer: MEDICARE

## 2024-10-31 VITALS — BODY MASS INDEX: 28.41 KG/M2 | HEIGHT: 67 IN | WEIGHT: 181 LBS

## 2024-10-31 DIAGNOSIS — M54.50 LOW BACK PAIN, UNSPECIFIED: ICD-10-CM

## 2024-10-31 DIAGNOSIS — M54.16 RADICULOPATHY, LUMBAR REGION: ICD-10-CM

## 2024-10-31 PROCEDURE — 99214 OFFICE O/P EST MOD 30 MIN: CPT

## 2024-10-31 RX ORDER — GABAPENTIN 300 MG/1
300 CAPSULE ORAL
Qty: 30 | Refills: 0 | Status: ACTIVE | COMMUNITY
Start: 2024-10-31 | End: 1900-01-01

## 2024-11-18 ENCOUNTER — APPOINTMENT (OUTPATIENT)
Dept: PAIN MANAGEMENT | Facility: CLINIC | Age: 69
End: 2024-11-18
Payer: MEDICARE

## 2024-11-18 VITALS — BODY MASS INDEX: 28.41 KG/M2 | HEIGHT: 67 IN | WEIGHT: 181 LBS

## 2024-11-18 DIAGNOSIS — M54.12 RADICULOPATHY, CERVICAL REGION: ICD-10-CM

## 2024-11-18 DIAGNOSIS — M47.812 SPONDYLOSIS W/OUT MYELOPATHY OR RADICULOPATHY, CERVICAL REGION: ICD-10-CM

## 2024-11-18 PROCEDURE — J3490M: CUSTOM | Mod: NC

## 2024-11-18 PROCEDURE — 99214 OFFICE O/P EST MOD 30 MIN: CPT | Mod: 25

## 2024-11-18 PROCEDURE — 20552 NJX 1/MLT TRIGGER POINT 1/2: CPT

## 2024-11-20 ENCOUNTER — APPOINTMENT (OUTPATIENT)
Age: 69
End: 2024-11-20
Payer: MEDICARE

## 2024-11-20 PROCEDURE — 62323 NJX INTERLAMINAR LMBR/SAC: CPT

## 2024-11-20 RX ORDER — ACETAMINOPHEN AND CODEINE PHOSPHATE 300; 15 MG/1; MG/1
300-15 TABLET ORAL EVERY 8 HOURS
Qty: 30 | Refills: 0 | Status: ACTIVE | COMMUNITY
Start: 2024-11-20 | End: 1900-01-01

## 2024-11-21 RX ORDER — OXYCODONE AND ACETAMINOPHEN 5; 325 MG/1; MG/1
5-325 TABLET ORAL
Qty: 28 | Refills: 0 | Status: ACTIVE | COMMUNITY
Start: 2024-11-21 | End: 1900-01-01

## 2024-12-04 ENCOUNTER — APPOINTMENT (OUTPATIENT)
Age: 69
End: 2024-12-04
Payer: MEDICARE

## 2024-12-04 PROCEDURE — 62321 NJX INTERLAMINAR CRV/THRC: CPT

## 2024-12-19 ENCOUNTER — APPOINTMENT (OUTPATIENT)
Dept: PAIN MANAGEMENT | Facility: CLINIC | Age: 69
End: 2024-12-19
Payer: MEDICARE

## 2024-12-19 VITALS — WEIGHT: 188 LBS | HEIGHT: 67 IN | BODY MASS INDEX: 29.51 KG/M2

## 2024-12-19 DIAGNOSIS — M54.12 RADICULOPATHY, CERVICAL REGION: ICD-10-CM

## 2024-12-19 DIAGNOSIS — M47.812 SPONDYLOSIS W/OUT MYELOPATHY OR RADICULOPATHY, CERVICAL REGION: ICD-10-CM

## 2024-12-19 PROCEDURE — 99214 OFFICE O/P EST MOD 30 MIN: CPT

## 2025-01-15 ENCOUNTER — APPOINTMENT (OUTPATIENT)
Age: 70
End: 2025-01-15

## 2025-01-30 ENCOUNTER — APPOINTMENT (OUTPATIENT)
Dept: PAIN MANAGEMENT | Facility: CLINIC | Age: 70
End: 2025-01-30